# Patient Record
Sex: FEMALE | Race: WHITE | NOT HISPANIC OR LATINO
[De-identification: names, ages, dates, MRNs, and addresses within clinical notes are randomized per-mention and may not be internally consistent; named-entity substitution may affect disease eponyms.]

---

## 2021-08-30 ENCOUNTER — APPOINTMENT (OUTPATIENT)
Dept: OBGYN | Facility: CLINIC | Age: 56
End: 2021-08-30

## 2021-08-30 PROBLEM — Z00.00 ENCOUNTER FOR PREVENTIVE HEALTH EXAMINATION: Status: ACTIVE | Noted: 2021-08-30

## 2022-07-11 ENCOUNTER — APPOINTMENT (OUTPATIENT)
Dept: OBGYN | Facility: CLINIC | Age: 57
End: 2022-07-11

## 2022-07-11 VITALS
HEIGHT: 62 IN | SYSTOLIC BLOOD PRESSURE: 148 MMHG | WEIGHT: 168 LBS | DIASTOLIC BLOOD PRESSURE: 80 MMHG | BODY MASS INDEX: 30.91 KG/M2 | HEART RATE: 69 BPM

## 2022-07-11 DIAGNOSIS — Z01.419 ENCOUNTER FOR GYNECOLOGICAL EXAMINATION (GENERAL) (ROUTINE) W/OUT ABNORMAL FINDINGS: ICD-10-CM

## 2022-07-11 DIAGNOSIS — N94.10 UNSPECIFIED DYSPAREUNIA: ICD-10-CM

## 2022-07-11 DIAGNOSIS — C50.919 MALIGNANT NEOPLASM OF UNSPECIFIED SITE OF UNSPECIFIED FEMALE BREAST: ICD-10-CM

## 2022-07-11 DIAGNOSIS — M79.606 PAIN IN LEG, UNSPECIFIED: ICD-10-CM

## 2022-07-11 PROCEDURE — 76830 TRANSVAGINAL US NON-OB: CPT

## 2022-07-11 PROCEDURE — 99386 PREV VISIT NEW AGE 40-64: CPT | Mod: 25

## 2022-07-11 NOTE — PROCEDURE
[Pelvic Pain] : pelvic pain [Transvaginal Ultrasound] : transvaginal ultrasound [FreeTextEntry3] : CERVIX NORMAL\par NO FREE FLUID\par ALL NORMAL\par NO CYSTS/POLYPS OR  FIBROIDS [FreeTextEntry5] : 28CC VOLUME, ENDOMETRIUM 2MM [FreeTextEntry7] : 1.4CC [FreeTextEntry8] : 1.5CC

## 2022-07-11 NOTE — PHYSICAL EXAM
[Examination Of The Breasts] : a normal appearance [No Masses] : no breast masses were palpable [Vulvar Atrophy] : vulvar atrophy [Labia Majora] : normal [Labia Minora] : normal [Atrophy] : atrophy [Normal] : normal [Uterine Adnexae] : normal

## 2022-07-11 NOTE — HISTORY OF PRESENT ILLNESS
[FreeTextEntry1] : here for an annual visit\par last visit 4 years ago\par Has complaints relating to weight gain, vertigo, painful intercourse, feeling stressed\par leg pain on the right since covid\par family hx of breast cancer\par \par last visit:\par \par \par    	Print\par Last amended by Alav Leon MD on 2018 at 4:51pm	View Changes: \par [email or web address] 2018 04:51pm\par Patient\par Name	ROXIE FOWLER (52yo, F) ID# [id number]	Appt. Date/Time	2018 03:30PM\par 	1965	Service Dept.	[facility]\par Provider	ALVA LEON MD\par Insurance	\par Med Primary: GHI (PPO)\par Insurance # : [id number]\par Policy/Group # : [id number]\par Prescription: ESI1 - Member is eligible. details\par Prescription: ESI1 - Member is eligible. details\par Prescription: ESI1 - Member is eligible. details\par Chief Complaint\par Well Woman Visit\par Patient's Care Team\par Primary Care Provider: PATSY STUBBS MD: [address] [place] [id number], Ph [phone], Fax [phone] NPI: [id number]\par Patient's Pharmacies\par [facility] - [place], [place] (ERX): [address] [facility] [address], Ph [phone], Fax [phone]\par Vitals\par 2018 04:16 pm\par Ht:	5 ft 2 in\par Wt:	163 lbs\par BMI:	29.8\par BP:	132/82 sitting\par Allergies\par Reviewed Allergies\par IODINATED CONTRAST- ORAL AND IV DYE	\par LATEX	\par PENICILLINS	\par x-ray dye\par Medications\par Reviewed Medications\par alclometasone 0.05 % topical cream\par 17   filled	[facility]\par ALPRAZolam 0.25 mg tablet\par 07/15/16   filled	[facility]\par amoxicillin 875 mg-potassium clavulanate 125 mg tablet\par 02/03/15   filled	[facility]\par atenolol 25 mg tablet\par 09/10/18   filled	[facility]\par atenolol 50 mg tablet\par 18   filled	[facility]\par chlorthalidone 25 mg tablet\par 10/29/18   filled	[facility]\par ciprofloxacin 500 mg tablet\par Take 1 tablet(s) every 12 hours by oral route for 7 days.\par 17   filled	[facility]\par clindamycin phosphate 1 % topical solution\par 16   filled	[facility]\par clotrimazole-betamethasone 1 %-0.05 % topical cream\par Apply 2 g twice a day by topical route as directed for 7 days.\par 17   filled	[facility]\par fluconazole 200 mg tablet\par Take 1 tablet(s) every day by oral route as directed for 3 days.\par 17   filled	[facility]\par meloxicam 15 mg tablet\par 17   filled	[facility]\par metoprolol succinate ER 25 mg tablet,extended release 24 hr\par 10/08/18   filled	[facility]\par omeprazole 40 mg capsule,delayed release\par 12/10/14   filled	[facility]\par penicillin V potassium 500 mg tablet\par 14   filled	[facility]\par potassium chloride ER 20 mEq tablet,extended release(part/cryst)\par 10/29/18   filled	[facility]\par Problems\par Reviewed Problems\par Female genital organ symptoms\par Irregular periods\par Menopausal and postmenopausal disorders\par Gynecologic examination\par Family History\par Reviewed Family History\par Mother	- Hypertensive disorder\par Father	- Malignant tumor of pancreas\par Sister	- Carcinoma of breast\par Social History\par Reviewed Social History\par Smoking Status: Former smoker (Notes: 24 Y/AGO)\par Surgical History\par Surgical History not reviewed (last reviewed 2018)\par Other - RHINOPLASTY\par Tonsillectomy\par GYN History\par Reviewed GYN History\par LMP: Definite.\par Menses Monthly: (Notes: SKIPPING).\par Date of LMP: 2017 (Notes: Ablation: 2017).\par SWELLING IN ABDOMEN\par Obstetric History\par Reviewed Obstetric History\par TOTAL	FULL	PRE	AB. I	AB. S	ECTOPICS	MULTIPLE	LIVING\par 2	1			1			1\par Past Medical History\par Past Medical History not reviewed (last reviewed 2018)\par Headaches or Migraines: Y\par Screening\par None recorded.\par HPI\par RECURRENT UTI'S/DYSPAREUNIA\par \par ROS\par Patient reports dry vaginal mucosa. She reports dyspareunia. She reports no fatigue, no fever, no significant weight gain, and no significant weight loss. She reports no abnormal moles and no rashes. She reports no irritation and no vision changes. She reports no hearing loss, no ear pain, no nose/sinus problems, no sore throat, no snoring, no dry mouth, and no mouth ulcers. She reports no dyspnea / shortness of breath, no cough, no sputum production, no hemoptysis, and no wheezing. She reports no chest pain, no palpitations, and no orthopnea. She reports no heartburn, no dysphagia, no nausea, no vomiting, no abdominal pain, no bowel movement changes, no diarrhea, no constipation, and no rectal bleeding. She reports no hematuria, no abnormal bleeding, no flank pain, no trouble urinating, no incontinence, no rash, no lesion, no discharge, no vaginal odor, and no vaginal itching. She reports no menstrual problems and no PMDD symptoms. She reports no muscle aches, no muscle weakness, no arthralgias/joint pain, and no back pain. She reports no headaches, no dizziness, no LOC, no weakness, no numbness, and no seizures. She reports no depression, no alcoholism, and no sleep disturbances.\par Physical Exam\par Patient is a 53-year-old female.\par \par Chaperone: Chaperone: present.\par \par Female Genitalia: Vulva: no masses or lesions and vulvar atrophy. Bladder/Urethra: no urethral discharge or mass and normal meatus and bladder non distended. Vagina no tenderness, erythema, cystocele, rectocele, abnormal vaginal discharge, or vesicle(s) or ulcers; ATROPHY. Cervix: no discharge or cervical motion tenderness and grossly normal. Uterus: normal size and shape and midline, mobile, non-tender, and no uterine prolapse. Adnexa/Parametria: no parametrial tenderness or mass and no adnexal tenderness or ovarian mass.\par \par Breast: Inspection/Palpation: no erythema, induration, tenderness, skin changes, abnormal secretions, or distinct masses and normal nipple appearance and non tender axillary lymph nodes.\par \par Abdomen: Auscultation/Inspection/Palpation: no tenderness, hepatomegaly, splenomegaly, masses, or CVA tenderness and soft, non-distended, and normal bowel sounds. Hernia: none palpated.\par Assessment / Plan\par 1. Gynecologic examination - 45 MIN VISIT\par Z01.411: Encounter for gynecological examination (general) (routine) with abnormal findings\par \par 2. Atrophy of vagina -\par MENOPause\par \par N95.2: Postmenopausal atrophic vaginitis\par \par 3. Dyspareunia -\par ATROPHY\par \par N94.10: Unspecified dyspareunia\par US, TRANSVAGINAL\par \par 4. Recurrent urinary tract infection -\par DUE TO DRYNESS\par \par START VAGINAL ESTROGEN IF MAMMO NL\par \par N39.0: Urinary tract infection, site not specified\par \par US, TRANSVAGINAL\par \par Review of us, transvaginal taken on 2018 at IN-OFFICE ORDER shows:\par Imaging Studies:\par Indications: pelvic pain.\par Uterus: present, anteverted, size (cm): ___ and volume (cc): 40.2.\par Endometrium: thickness 1.0 mm.\par Cervix: normal.\par Cul de sac: no fluid was demonstrated.\par Right Ovary: volume (cc): 2.7.\par Left Ovary: volume (cc): 3.4.\par \par Return to Office\par Alva Leon MD for 6 MONTH GYN at [facility]K OFF[facility] on 2019 at 04:15 PM\par Amendment Sign-Off\par Encounter signed-off by Alva Leon MD, 2018.\par Encounter performed and documented by Alva Leon MD\par Encounter reviewed & signed by Alva Leon MD on 2018 at 4:47pm\par Amendment closed by Alva Leon MD on 2018 at 4:51pm

## 2022-07-15 LAB — HPV HIGH+LOW RISK DNA PNL CVX: NOT DETECTED

## 2022-07-18 LAB — CYTOLOGY CVX/VAG DOC THIN PREP: ABNORMAL

## 2022-08-08 ENCOUNTER — APPOINTMENT (OUTPATIENT)
Dept: OBGYN | Facility: CLINIC | Age: 57
End: 2022-08-08

## 2024-11-01 DIAGNOSIS — Z12.39 ENCOUNTER FOR OTHER SCREENING FOR MALIGNANT NEOPLASM OF BREAST: ICD-10-CM

## 2024-11-04 ENCOUNTER — APPOINTMENT (OUTPATIENT)
Dept: OBGYN | Facility: CLINIC | Age: 59
End: 2024-11-04
Payer: COMMERCIAL

## 2024-11-04 VITALS
DIASTOLIC BLOOD PRESSURE: 82 MMHG | HEART RATE: 69 BPM | HEIGHT: 62 IN | SYSTOLIC BLOOD PRESSURE: 144 MMHG | WEIGHT: 147 LBS | BODY MASS INDEX: 27.05 KG/M2

## 2024-11-04 DIAGNOSIS — R39.89 OTHER SYMPTOMS AND SIGNS INVOLVING THE GENITOURINARY SYSTEM: ICD-10-CM

## 2024-11-04 DIAGNOSIS — Z01.419 ENCOUNTER FOR GYNECOLOGICAL EXAMINATION (GENERAL) (ROUTINE) W/OUT ABNORMAL FINDINGS: ICD-10-CM

## 2024-11-04 DIAGNOSIS — Z87.891 PERSONAL HISTORY OF NICOTINE DEPENDENCE: ICD-10-CM

## 2024-11-04 DIAGNOSIS — N94.10 UNSPECIFIED DYSPAREUNIA: ICD-10-CM

## 2024-11-04 LAB
BILIRUB UR QL STRIP: NORMAL
CLARITY UR: CLEAR
COLLECTION METHOD: NORMAL
GLUCOSE UR-MCNC: NORMAL
HCG UR QL: 0.2 EU/DL
HGB UR QL STRIP.AUTO: NORMAL
KETONES UR-MCNC: NORMAL
LEUKOCYTE ESTERASE UR QL STRIP: ABNORMAL
NITRITE UR QL STRIP: NORMAL
PH UR STRIP: 6
PROT UR STRIP-MCNC: NORMAL
SP GR UR STRIP: 1.02

## 2024-11-04 PROCEDURE — 99396 PREV VISIT EST AGE 40-64: CPT | Mod: 25

## 2024-11-04 PROCEDURE — 81003 URINALYSIS AUTO W/O SCOPE: CPT | Mod: QW

## 2024-11-04 PROCEDURE — 99459 PELVIC EXAMINATION: CPT

## 2024-11-04 RX ORDER — ESTRADIOL 0.1 MG/G
0.1 CREAM VAGINAL
Qty: 3 | Refills: 3 | Status: ACTIVE | COMMUNITY
Start: 2024-11-04 | End: 1900-01-01

## 2024-11-04 RX ORDER — ATENOLOL 50 MG/1
TABLET ORAL
Refills: 0 | Status: ACTIVE | COMMUNITY

## 2024-11-08 LAB
BACTERIA UR CULT: NORMAL
HPV HIGH+LOW RISK DNA PNL CVX: NOT DETECTED

## 2024-11-15 LAB — CYTOLOGY CVX/VAG DOC THIN PREP: NORMAL

## 2024-12-10 ENCOUNTER — NON-APPOINTMENT (OUTPATIENT)
Age: 59
End: 2024-12-10